# Patient Record
Sex: MALE | Race: WHITE | NOT HISPANIC OR LATINO
[De-identification: names, ages, dates, MRNs, and addresses within clinical notes are randomized per-mention and may not be internally consistent; named-entity substitution may affect disease eponyms.]

---

## 2017-10-12 PROBLEM — Z00.00 ENCOUNTER FOR PREVENTIVE HEALTH EXAMINATION: Status: ACTIVE | Noted: 2017-10-12

## 2024-01-12 ENCOUNTER — NON-APPOINTMENT (OUTPATIENT)
Age: 69
End: 2024-01-12

## 2024-01-12 ENCOUNTER — APPOINTMENT (OUTPATIENT)
Dept: CARDIOLOGY | Facility: CLINIC | Age: 69
End: 2024-01-12
Payer: MEDICARE

## 2024-01-12 VITALS
HEART RATE: 87 BPM | DIASTOLIC BLOOD PRESSURE: 78 MMHG | SYSTOLIC BLOOD PRESSURE: 128 MMHG | OXYGEN SATURATION: 98 % | WEIGHT: 290 LBS | BODY MASS INDEX: 39.28 KG/M2 | HEIGHT: 72 IN

## 2024-01-12 DIAGNOSIS — Z78.9 OTHER SPECIFIED HEALTH STATUS: ICD-10-CM

## 2024-01-12 DIAGNOSIS — E03.9 HYPOTHYROIDISM, UNSPECIFIED: ICD-10-CM

## 2024-01-12 DIAGNOSIS — Z87.891 PERSONAL HISTORY OF NICOTINE DEPENDENCE: ICD-10-CM

## 2024-01-12 DIAGNOSIS — N18.2 CHRONIC KIDNEY DISEASE, STAGE 2 (MILD): ICD-10-CM

## 2024-01-12 DIAGNOSIS — Z82.49 FAMILY HISTORY OF ISCHEMIC HEART DISEASE AND OTHER DISEASES OF THE CIRCULATORY SYSTEM: ICD-10-CM

## 2024-01-12 DIAGNOSIS — F42.9 OBSESSIVE-COMPULSIVE DISORDER, UNSPECIFIED: ICD-10-CM

## 2024-01-12 PROCEDURE — 99204 OFFICE O/P NEW MOD 45 MIN: CPT

## 2024-01-12 PROCEDURE — 93000 ELECTROCARDIOGRAM COMPLETE: CPT

## 2024-01-12 RX ORDER — ANAGRELIDE 0.5 MG/1
0.5 CAPSULE ORAL DAILY
Refills: 0 | Status: ACTIVE | COMMUNITY

## 2024-01-12 RX ORDER — FLUVOXAMINE MALEATE 25 MG/1
TABLET, FILM COATED ORAL
Refills: 0 | Status: ACTIVE | COMMUNITY

## 2024-01-12 RX ORDER — LEVOTHYROXINE SODIUM 13 UG/1
13 CAPSULE ORAL DAILY
Refills: 0 | Status: ACTIVE | COMMUNITY

## 2024-01-12 NOTE — DISCUSSION/SUMMARY
[FreeTextEntry1] : Hypertension:  Keep blood pressure log.  Check echocardiogram.  CKD: Follow serum creatinine.  Avoid NSAIDs.  Avoid nephrotoxic agents.  Increase oral intake of water  Low HDL: LDL is 92.  Statin therapy would not be indicated. It would be interesting to see what his CT calcium score would be.  However therapeutic options are limited  ETT should be performed.  Blood pressure response to exercise would be helpful and exercise capacity evaluation is important  Thank you for this referral and allowing me to participate in the care of this patient.  If I can be of any further help or  if you have any questions, please do not hesitate to contact me   Sincerely,  Bernard Thornton MD, FACC, [place]

## 2024-01-12 NOTE — REASON FOR VISIT
[FreeTextEntry1] : Dao is a 68-year-old male with chronic backache, depression and hypothyroidism.  No past history of diabetes.  History of hypertension?  He does have dyslipidemia with very low HDL: It was 28 in December 2023.  Also clinical serum creatinine was 1.2  Denies exertional chest pain or shortness of breath.  Activity limited by chronic low backache.  No PND or orthopnea.  No pedal edema.  No dizziness or syncope

## 2024-03-06 ENCOUNTER — APPOINTMENT (OUTPATIENT)
Dept: CARDIOLOGY | Facility: CLINIC | Age: 69
End: 2024-03-06
Payer: MEDICARE

## 2024-03-06 DIAGNOSIS — E78.6 LIPOPROTEIN DEFICIENCY: ICD-10-CM

## 2024-03-06 DIAGNOSIS — R00.2 PALPITATIONS: ICD-10-CM

## 2024-03-06 DIAGNOSIS — F32.A DEPRESSION, UNSPECIFIED: ICD-10-CM

## 2024-03-06 DIAGNOSIS — I10 ESSENTIAL (PRIMARY) HYPERTENSION: ICD-10-CM

## 2024-03-06 PROCEDURE — G2211 COMPLEX E/M VISIT ADD ON: CPT

## 2024-03-06 PROCEDURE — 99214 OFFICE O/P EST MOD 30 MIN: CPT | Mod: 25

## 2024-03-06 PROCEDURE — 93015 CV STRESS TEST SUPVJ I&R: CPT

## 2024-03-06 PROCEDURE — 93306 TTE W/DOPPLER COMPLETE: CPT

## 2024-03-06 NOTE — CARDIOLOGY SUMMARY
[de-identified] : ETT 3/6/2024 Bruno protocol 4min 32 sec with no evidence of exercise induced ischemia [de-identified] : EKG 3/6/2024 NSR [de-identified] : Echo 3/6/2024 EF 55-60%

## 2024-03-06 NOTE — DISCUSSION/SUMMARY
[FreeTextEntry1] : CORIE PORTER  is a 69 year M  who presents today Mar 06, 2024 with the above history and the following active issues.  Hypertension, blood pressure well controlled on my assessment today. Normal Blood pressure response to exercise.  Low sodium diet.  Continue with cardiovascular exercise.  CKD: follow-up with PCP/nephrology Follow serum creatinine.  Avoid NSAIDs.  Avoid nephrotoxic agents.  Increase oral intake of water  Low HDL: LDL is 92.  Statin therapy would not be indicated.  Obesity, recommend weight loss. Lifestyle and risk factor modification.  Palpitations Recommend 1 week ZIO monitor Avoid stimulants  Echo with normal resting cardiac structure and function. ETT with no evidence of exercise induced ischemia or arrhythmias. Continue to monitor.   Red flag symptoms which would warrant sooner emergent evaluation reviewed with the patient.  Questions and concerns were addressed and answered.  Limitations of non-invasive testing reviewed  Sincerely,  Sandy Hadley PA-C Patients history, testing and plan reviewed with supervising MD: Dr. Julian Rizo

## 2024-03-06 NOTE — REASON FOR VISIT
[FreeTextEntry1] : DAO PORTER  is a 69 year M  who presents today Mar 06, 2024 for ETT and clinical follow-up. ETT was negative for exercise induced ischemia and no exercise induced arrhythmias. Rare PVC's with stress.  He continues to have sensation of palpitations with his daily walking. There are no associated symptoms. Overall he has been feeling well. There has been no recent illness or hospital stay. Medications have remained unchanged. Asymptomatic from cardiovascular and arrhythmia standpoint.   Today he denies chest pain, pressure, unusual shortness of breath, lightheadedness, dizziness, near syncope or syncope.   Dao is a 68-year-old male with chronic backache, depression and hypothyroidism.   There is no prior history of myocardial infarction, coronary revascularization, history of ischemic heart disease, or symptomatic congestive heart failure. There is no history of symptomatic arrhythmias including atrial fibrillation.

## 2024-03-11 NOTE — REASON FOR VISIT
[FreeTextEntry1] : DAO PROTER  is a 69 year M  who presents today Mar 06, 2024 for ETT and clinical follow-up. ETT was negative for exercise induced ischemia and no exercise induced arrhythmias. Rare PVC's with stress.  He continues to have sensation of palpitations with his daily walking. There are no associated symptoms.  Overall he has been feeling well. There has been no recent illness or hospital stay. Medications have remained unchanged. Asymptomatic from cardiovascular and arrhythmia standpoint.   Today he denies chest pain, pressure, unusual shortness of breath, lightheadedness, dizziness, near syncope or syncope.    Dao is a 68-year-old male with chronic backache, depression and hypothyroidism.

## 2024-03-11 NOTE — DISCUSSION/SUMMARY
[FreeTextEntry1] : CORIE PORTER  is a 69 year M  who presents today Mar 06, 2024 with the above history and the following active issues.  Hypertension, blood pressure well controlled on my assessment today. Normal Blood pressure response to exercise.  Low sodium diet.  Continue with cardiovascular exercise.  CKD: follow-up with PCP/nephrology Follow serum creatinine.  Avoid NSAIDs.  Avoid nephrotoxic agents.  Increase oral intake of water  Low HDL: LDL is 92.  Statin therapy would not be indicated.  Obesity, recommend weight loss. Lifestyle and risk factor modification.  Palpitations Recommend 1 week ZIO monitor Avoid stimulants  Red flag symptoms which would warrant sooner emergent evaluation reviewed with the patient.  Questions and concerns were addressed and answered.  Limitations of non-invasive testing reviewed  Sincerely,  Sandy Hadley PA-C Patients history, testing and plan reviewed with supervising MD: Dr. Julian Rizo

## 2024-03-11 NOTE — CARDIOLOGY SUMMARY
[de-identified] : EKG 3/6/2024 NSR [de-identified] : ETT 3/6/2024 Bruno protocol 4min 32 sec with no evidence of exercise induced ischemia

## 2024-03-21 ENCOUNTER — NON-APPOINTMENT (OUTPATIENT)
Age: 69
End: 2024-03-21